# Patient Record
Sex: MALE | Race: OTHER | URBAN - METROPOLITAN AREA
[De-identification: names, ages, dates, MRNs, and addresses within clinical notes are randomized per-mention and may not be internally consistent; named-entity substitution may affect disease eponyms.]

---

## 2018-08-21 PROBLEM — Z00.00 ENCOUNTER FOR PREVENTIVE HEALTH EXAMINATION: Status: ACTIVE | Noted: 2018-08-21

## 2018-08-29 ENCOUNTER — EMERGENCY (EMERGENCY)
Facility: HOSPITAL | Age: 43
LOS: 1 days | Discharge: HOME | End: 2018-08-29
Attending: EMERGENCY MEDICINE | Admitting: EMERGENCY MEDICINE
Payer: MEDICAID

## 2018-08-29 VITALS
HEART RATE: 77 BPM | WEIGHT: 315 LBS | SYSTOLIC BLOOD PRESSURE: 131 MMHG | OXYGEN SATURATION: 98 % | TEMPERATURE: 98 F | DIASTOLIC BLOOD PRESSURE: 72 MMHG | HEIGHT: 71 IN | RESPIRATION RATE: 16 BRPM

## 2018-08-29 VITALS
SYSTOLIC BLOOD PRESSURE: 118 MMHG | HEART RATE: 67 BPM | TEMPERATURE: 98 F | DIASTOLIC BLOOD PRESSURE: 63 MMHG | OXYGEN SATURATION: 95 % | RESPIRATION RATE: 16 BRPM

## 2018-08-29 DIAGNOSIS — M54.5 LOW BACK PAIN: ICD-10-CM

## 2018-08-29 DIAGNOSIS — M79.1 MYALGIA: ICD-10-CM

## 2018-08-29 PROCEDURE — 74176 CT ABD & PELVIS W/O CONTRAST: CPT

## 2018-08-29 PROCEDURE — 87086 URINE CULTURE/COLONY COUNT: CPT

## 2018-08-29 PROCEDURE — 81003 URINALYSIS AUTO W/O SCOPE: CPT

## 2018-08-29 PROCEDURE — 99284 EMERGENCY DEPT VISIT MOD MDM: CPT

## 2018-08-29 PROCEDURE — 85025 COMPLETE CBC W/AUTO DIFF WBC: CPT

## 2018-08-29 PROCEDURE — 74176 CT ABD & PELVIS W/O CONTRAST: CPT | Mod: 26

## 2018-08-29 PROCEDURE — 80053 COMPREHEN METABOLIC PANEL: CPT

## 2018-08-29 PROCEDURE — 36415 COLL VENOUS BLD VENIPUNCTURE: CPT

## 2018-08-29 RX ORDER — SODIUM CHLORIDE 9 MG/ML
1000 INJECTION INTRAMUSCULAR; INTRAVENOUS; SUBCUTANEOUS ONCE
Qty: 0 | Refills: 0 | Status: COMPLETED | OUTPATIENT
Start: 2018-08-29 | End: 2018-08-29

## 2018-08-29 RX ADMIN — SODIUM CHLORIDE 1000 MILLILITER(S): 9 INJECTION INTRAMUSCULAR; INTRAVENOUS; SUBCUTANEOUS at 17:47

## 2018-08-29 NOTE — ED ADULT TRIAGE NOTE - OTHER COMPLAINTS
pt c.o L flank pain, intermittent x months. denies urinary symptoms. no injury. pt seen at [facility], reports having abnormal lab results r/t kidney.

## 2018-08-29 NOTE — ED PROVIDER NOTE - OBJECTIVE STATEMENT
Pt w/ PMHx renal colic, ? hx CKD p/w L lower back pain. The pain is intermittent, throbbing in nature. The pain is non radiating. No abd pain, n/v. No F/U/D or hematuria. No oliguria. No LE pain, paresthesias, or weakness. Pt reports normal gait. No f/c. Pt has not taken anything for pain. Pt reports he has been previously seen at OSH and advise he might have renal failure months ago, but pt has no regular care and has not seen anyone regarding this.

## 2018-08-29 NOTE — ED ADULT NURSE NOTE - OBJECTIVE STATEMENT
Pt CO Left flank Pain intermittently x1 year.  Pt states "I feel like its worse when I wake up but I just saw my PCP 2-3 months ago."  PT denies N/V/D, SOB, Fevers, CP and Dizziness.  No Sig PMhx per pt.

## 2018-08-29 NOTE — ED PROVIDER NOTE - MEDICAL DECISION MAKING DETAILS
Patient presents with low back pain, most likely of musculoskeletal origin; there is no midline ttp; no neurologic signs or symptoms on history or exam; no numbness/tingling or paresthesias; gait is normal, no bowel or bladder incontinence; no evidence of cord compression or cauda equina syndrome; in addition, there is no fever and no risk factors or evidence of epidural abscess; no evidence of cord transection or metastatic process or other acute spinal cord injury.  Patient has a normal neuro exam and back exam; no evidence of AAA/dissection; Low suspicion [place] / renal colic. Pt declines analgesia. Check labs, UA, CT a/p non con. If w/u neg, anticipate d/c w/ outpt f/u

## 2018-08-29 NOTE — ED PROVIDER NOTE - PHYSICAL EXAMINATION
Constitutional: Well appearing, well nourished, awake, alert, oriented to person, place, time/situation and in no apparent distress.  ENMT: Airway patent. Normal MM  Eyes: Clear bilaterally  Cardiac: Normal rate, regular rhythm.  Heart sounds S1, S2.  No murmurs, rubs or gallops.  Respiratory: Breaths sounds equal and clear b/l. No increased WOB, tachypnea, hypoxia, or accessory mm use. Pt speaks in full sentences.   Gastrointestinal: Abd soft, NT, ND, NABS. No guarding, rebound, or rigidity. No pulsatile abdominal masses. No organomegaly appreciated. No CVAT   Musculoskeletal: Range of motion is not limited. No midline spinal ttp. No paraspinal mm ttp.   Neuro: Alert and oriented, grossly non focal. 5/5 strength b/l LE. Normal sensation. No saddle anesthesia  Skin: Skin normal color for race, warm, dry and intact. No evidence of rash.  Psych: Alert and oriented to person, place, time/situation. normal mood and affect. no apparent risk to self or others.

## 2018-08-29 NOTE — ED ADULT NURSE NOTE - NSIMPLEMENTINTERV_GEN_ALL_ED
Implemented All Universal Safety Interventions:  [place] to call system. Call bell, personal items and telephone within reach. Instruct patient to call for assistance. Room bathroom lighting operational. Non-slip footwear when patient is off stretcher. Physically safe environment: no spills, clutter or unnecessary equipment. Stretcher in lowest position, wheels locked, appropriate side rails in place.

## 2018-08-29 NOTE — ED PROVIDER NOTE - CARE PLAN
Principal Discharge DX:	Left-sided low back pain without sciatica, unspecified chronicity  Secondary Diagnosis:	Musculoskeletal pain

## 2018-08-29 NOTE — ED ADULT NURSE NOTE - OTHER COMPLAINTS
pt c.o L flank pain, intermittent x months. denies urinary symptoms. no injury. pt seen at [facility], reports having abnormal lab results r/t kidney.

## 2018-08-29 NOTE — ED PROVIDER NOTE - PROGRESS NOTE DETAILS
W/u neg for acute pathology. Pt advised to call [facility] for PCP referral and f/u. Pt provided a copy of his results and instructed on incidental findings.

## 2018-09-05 ENCOUNTER — APPOINTMENT (OUTPATIENT)
Dept: UROLOGY | Facility: CLINIC | Age: 43
End: 2018-09-05

## 2018-09-19 ENCOUNTER — APPOINTMENT (OUTPATIENT)
Dept: BARIATRICS | Facility: CLINIC | Age: 43
End: 2018-09-19
Payer: MEDICAID

## 2018-09-19 VITALS
HEIGHT: 71 IN | OXYGEN SATURATION: 95 % | DIASTOLIC BLOOD PRESSURE: 78 MMHG | HEART RATE: 76 BPM | BODY MASS INDEX: 44.1 KG/M2 | TEMPERATURE: 98.2 F | SYSTOLIC BLOOD PRESSURE: 114 MMHG | WEIGHT: 315 LBS

## 2018-09-19 DIAGNOSIS — M25.579 PAIN IN UNSPECIFIED ANKLE AND JOINTS OF UNSPECIFIED FOOT: ICD-10-CM

## 2018-09-19 DIAGNOSIS — E66.01 MORBID (SEVERE) OBESITY DUE TO EXCESS CALORIES: ICD-10-CM

## 2018-09-19 DIAGNOSIS — Z83.3 FAMILY HISTORY OF DIABETES MELLITUS: ICD-10-CM

## 2018-09-19 DIAGNOSIS — Z87.891 PERSONAL HISTORY OF NICOTINE DEPENDENCE: ICD-10-CM

## 2018-09-19 DIAGNOSIS — Z78.9 OTHER SPECIFIED HEALTH STATUS: ICD-10-CM

## 2018-09-19 DIAGNOSIS — M54.5 LOW BACK PAIN: ICD-10-CM

## 2018-09-19 PROCEDURE — 99203 OFFICE O/P NEW LOW 30 MIN: CPT

## 2018-10-03 ENCOUNTER — APPOINTMENT (OUTPATIENT)
Dept: BARIATRICS | Facility: CLINIC | Age: 43
End: 2018-10-03

## 2018-10-17 ENCOUNTER — APPOINTMENT (OUTPATIENT)
Dept: BARIATRICS | Facility: CLINIC | Age: 43
End: 2018-10-17
